# Patient Record
(demographics unavailable — no encounter records)

---

## 2025-03-11 NOTE — RISK ASSESSMENT
[1] : 2) Feeling down, depressed, or hopeless for several days (1) [PHQ-2 Positive] : PHQ-2 Positive [Several Days (1)] : 4.) Feeling tired or having little energy? Several days [Not at All (0)] : 9.) Thoughts that you would be off dead or of hurting yourself in some way? Not at all [Not at all] : How difficult have these problems made it for you to do your work, take care of things at home, or get along with people? Not at all [I have developed a follow-up plan documented below in the note.] : I have developed a follow-up plan documented below in the note. [No Increased risk of SCA or SCD] : No Increased risk of SCA or SCD    [HUC2Cufta] : 2 [[id number]] : 3 [Have you ever fainted, passed out or had an unexplained seizure suddenly and without warning, especially during exercise or in response] : Have you ever fainted, passed out or had an unexplained seizure suddenly and without warning, especially during exercise or in response to sudden loud noises such as doorbells, alarm clocks and ringing telephones? No [Have you ever had exercise-related chest pain or shortness of breath?] : Have you ever had exercise-related chest pain or shortness of breath? No [Has anyone in your immediate family (parents, grandparents, siblings) or other more distant relatives (aunts, uncles, cousins)  of heart] : Has anyone in your immediate family (parents, grandparents, siblings) or other more distant relatives (aunts, uncles, cousins)  of heart problems or had an unexpected sudden death before age 50 (This would include unexpected drownings, unexplained car accidents in which the relative was driving or sudden infant death syndrome.)? No [Are you related to anyone with hypertrophic cardiomyopathy or hypertrophic obstructive cardiomyopathy, Marfan syndrome, arrhythmogenic] : Are you related to anyone with hypertrophic cardiomyopathy or hypertrophic obstructive cardiomyopathy, Marfan syndrome, arrhythmogenic right ventricular cardiomyopathy, long QT syndrome, short QT syndrome, Brugada syndrome or catecholaminergic polymorphic ventricular tachycardia, or anyone younger than 50 years with a pacemaker or implantable defibrillator? No

## 2025-03-11 NOTE — HISTORY OF PRESENT ILLNESS
[Toothpaste] : Primary Fluoride Source: Toothpaste [Needs Immunizations] : Needs immunizations [Normal] : normal [LMP: _____] : LMP: [unfilled] [Days of Bleeding: _____] : Days of bleeding: [unfilled] [Cycle Length: _____ days] : Cycle Length: [unfilled] days [Age of Menarche: ____] : Age of Menarche: [unfilled] [Menstrual products used per day: _____] : Menstrual products used per day: [unfilled] [Painful Cramps] : painful cramps [Eats meals with family] : eats meals with family [Has family members/adults to turn to for help] : has family members/adults to turn to for help [Is permitted and is able to make independent decisions] : Is permitted and is able to make independent decisions [Grade: ____] : Grade: [unfilled] [Normal Performance] : normal performance [Eats regular meals including adequate fruits and vegetables] : eats regular meals including adequate fruits and vegetables [Drinks non-sweetened liquids] : drinks non-sweetened liquids  [Calcium source] : calcium source [Has friends] : has friends [Has interests/participates in community activities/volunteers] : has interests/participates in community activities/volunteers. [Uses safety belts/safety equipment] : uses safety belts/safety equipment  [Has peer relationships free of violence] : has peer relationships free of violence [No] : Patient has not had sexual intercourse [HIV Screening Declined] : HIV Screening Declined [With Teen] : teen [Has ways to cope with stress] : has ways to cope with stress [Displays self-confidence] : displays self-confidence [Gets depressed, anxious, or irritable/has mood swings] : gets depressed, anxious, or irritable/has mood swings [NO] : No [Irregular menses] : no irregular menses [Heavy Bleeding] : no heavy bleeding [Hirsutism] : no hirsutism [Acne] : no acne [Tampon Use] : no tampon use [Sleep Concerns] : no sleep concerns [Has concerns about body or appearance] : does not have concerns about body or appearance [At least 1 hour of physical activity a day] : does not do at least 1 hour of physical activity a day [Screen time (except homework) less than 2 hours a day] : no screen time (except homework) less than 2 hours a day [Uses electronic nicotine delivery system] : does not use electronic nicotine delivery system [Uses tobacco] : does not use tobacco [Uses drugs] : does not use drugs  [Drinks alcohol] : does not drink alcohol [Impaired/distracted driving] : no impaired/distracted driving [Has problems with sleep] : does not have problems with sleep [Has thought about hurting self or considered suicide] : has not thought about hurting self or considered suicide [FreeTextEntry7] : No recent hospitalizations or surgeries. She reports that she had fever 11 days ago, but no other symptoms. She took Tylenol and her fever resolved. [de-identified] : She denies any medical concerns today. [de-identified] : Last dental visit >1 year ago. Brushes teeth BID. Flosses: never [de-identified] : Due for COVID-19 and Influenza vaccines [FreeTextEntry8] : Takes Extra strength Tylenol for menstrual cramps [de-identified] : Lives with mom [de-identified] : [employer] [de-identified] : Interested in learning to play softball [de-identified] : Identifies as female; Interested in males only; Currently in a relationship with boyfriend, 15 years old.

## 2025-03-11 NOTE — DISCUSSION/SUMMARY
[Normal Development] : development  [No Elimination Concerns] : elimination [Continue Regimen] : feeding [No Skin Concerns] : skin [Normal Sleep Pattern] : sleep [BMI ___] : body mass index of [unfilled] [None] : no medical problems [Anticipatory Guidance Given] : Anticipatory guidance addressed as per the history of present illness section [Physical Growth and Development] : physical growth and development [Social and Academic Competence] : social and academic competence [Emotional Well-Being] : emotional well-being [Risk Reduction] : risk reduction [Violence and Injury Prevention] : violence and injury prevention [Full Activity without restrictions including Physical Education & Athletics] : Full Activity without restrictions including Physical Education & Athletics [I have examined the above-named student and completed the preparticipation physical evaluation. The athlete does not present apparent clinical contraindications to practice and participate in sport(s) as outlined above. A copy of the physical exam is on r] : I have examined the above-named student and completed the preparticipation physical evaluation. The athlete does not present apparent clinical contraindications to practice and participate in sport(s) as outlined above. A copy of the physical exam is on record in my office and can be made available to the school at the request of the parents. If conditions arise after the athlete has been cleared for participation, the physician may rescind the clearance until the problem is resolved and the potential consequences are completely explained to the athlete (and parents/guardians). [FreeTextEntry1] : 14 year old female presents to [facility] for [facility]PE and sports clearance paperwork. 1. Need for routine preventative dental care -Referral to dentist -Provided handout of local [facility] in [facility] [place]; Instructed patient to verify insurance coverage and schedule next available appointment  2. Needs immunizations -Pt is due for ___Influenza and COVID-19____ vaccines. Provided patient with VIS and vaccine consent for parent to review, sign, and return for administration.   3. Obesity -BMI in the 99th percentile -BP WNL -The following labs were sent: Hemoglobin A1c, AST, ALT, and Lipid profile.  -Instructed patient to get moving daily. Decrease sugary drinks (soda/juice); decrease snack foods (candy, cookies, chips). -Will notify patient for abnormal results.  4. Screening for BELKYS -Hemoglobin and Hematocrit sent to lab; Will notify patient for abnormal results  5. MH -[facility] performed and reviewed with patient. PHQ-2/9 performed. Offered MH services. Pt declined today. No immediate risk noted.  6. Paperwork -Provided patient with health report card and CIR for parent to review -PSAL form completed for submission to   RTC in 1 year for CPE or sooner as needed.

## 2025-03-11 NOTE — PHYSICAL EXAM
[Alert] : alert [No Acute Distress] : no acute distress [Normocephalic] : normocephalic [Atraumatic] : atraumatic [EOMI Bilateral] : EOMI bilateral [PERRLA] : ROSE [Conjunctivae with no discharge] : conjunctivae with no discharge [No Excess Tearing] : no excess tearing [Clear tympanic membranes with bony landmarks and light reflex present bilaterally] : clear tympanic membranes with bony landmarks and light reflex present bilaterally  [Auditory Canals Clear] : auditory canals clear [Pink Nasal Mucosa] : pink nasal mucosa [Nares Patent] : nares patent [No Discharge] : no discharge [Nonerythematous Oropharynx] : nonerythematous oropharynx [No Caries] : no caries [Palate Intact] : palate intact [Uvula Midline] : uvula midline [Supple, full passive range of motion] : supple, full passive range of motion [No Palpable Masses] : no palpable masses [Trachea Midline] : trachea midline [Clear to Auscultation Bilaterally] : clear to auscultation bilaterally [Symmetric Chest Rise] : symmetric chest rise [Regular Rate and Rhythm] : regular rate and rhythm [Normal S1, S2 audible] : normal S1, S2 audible [No Murmurs] : no murmurs [Soft] : soft [NonTender] : non tender [Non Distended] : non distended [Normoactive Bowel Sounds] : normoactive bowel sounds [No Hepatomegaly] : no hepatomegaly [No Splenomegaly] : no splenomegaly [Yossi: _____] : Yossi [unfilled] [Normal External Genitalia] : normal external genitalia [No Abnormal Lymph Nodes Palpated] : no abnormal lymph nodes palpated [Normal Muscle Tone] : normal muscle tone [No Gait Asymmetry] : no gait asymmetry [No pain or deformities with palpation of bone, muscles, joints] : no pain or deformities with palpation of bone, muscles, joints [Moves all extremities x 4] : moves all extremities x4 [Straight] : straight [No Scoliosis] : no scoliosis [+2 Patella DTR] : +2 patella DTR [Cranial Nerves Grossly Intact] : cranial nerves grossly intact [No Rash or Lesions] : no rash or lesions [FreeTextEntry8] : +2 pedal pulses [de-identified] : deferred [de-identified] : normal gait for age, good posture, normal clinical alignment in upper and lower extremities; heel-toe walk intact; duck walk intact; hops on one foot bilaterally without difficulty  [de-identified] : Fine hair noted on bilateral cheeks

## 2025-03-27 NOTE — HISTORY OF PRESENT ILLNESS
[FreeTextEntry6] : 14 year old female presents to [facility] for follow-up  Destephenie reports that she does not exercise Reports that she tried out for softball but didn't make the team  Eats 2 meals per day 24 hours: Breakfast: Bagel with cream cheese Dinner: spaghetti with sauce Snacks: none Lunch: doesn't remember, but state she doesn't usually eat lunch Breakfast: Honey nut cheerios with milk Fruits per day: she ate grapes Vegetables per day: none  Eats red meat approximately 2x per week Drinks juice: occasionally Water: 32 ounces

## 2025-03-27 NOTE — DISCUSSION/SUMMARY
[FreeTextEntry1] : 14 year old female presents to [facility] for follow-up. -All lab results drawn at CPE on 3/11/2025 shared with patient 1. Dyslipidemia -To lower LDL advised patient to limit/eliminate red meat, eggs, and cheese.  Suggested in place of red meat to incorporate chicken or turkey. -Explained to patient that a low HDL places patient at risk for atherosclerotic cardiovascular disease.  To increase HDL advised patient to increase intake of fiber by eating more raw vegetables and fruits. Suggested: 1. Regular exercise 2. Abstain from smoking  3. Healthy diet; Patient given handout on healthy fats 4. Attain target body weight  RTC in 1 year or sooner as needed.